# Patient Record
Sex: MALE | Race: OTHER | Employment: OTHER | URBAN - METROPOLITAN AREA
[De-identification: names, ages, dates, MRNs, and addresses within clinical notes are randomized per-mention and may not be internally consistent; named-entity substitution may affect disease eponyms.]

---

## 2019-01-04 ENCOUNTER — OFFICE VISIT (OUTPATIENT)
Dept: URGENT CARE | Facility: CLINIC | Age: 44
End: 2019-01-04
Payer: COMMERCIAL

## 2019-01-04 VITALS
SYSTOLIC BLOOD PRESSURE: 150 MMHG | RESPIRATION RATE: 18 BRPM | DIASTOLIC BLOOD PRESSURE: 89 MMHG | HEIGHT: 68 IN | OXYGEN SATURATION: 96 % | WEIGHT: 230 LBS | HEART RATE: 60 BPM | TEMPERATURE: 98 F | BODY MASS INDEX: 34.86 KG/M2

## 2019-01-04 DIAGNOSIS — T17.208A FOREIGN BODY IN PHARYNX, INITIAL ENCOUNTER: Primary | ICD-10-CM

## 2019-01-04 PROCEDURE — 99213 OFFICE O/P EST LOW 20 MIN: CPT | Performed by: EMERGENCY MEDICINE

## 2019-01-04 RX ORDER — DOXYCYCLINE 100 MG/1
100 TABLET ORAL 2 TIMES DAILY
Qty: 20 TABLET | Refills: 0 | Status: SHIPPED | OUTPATIENT
Start: 2019-01-04 | End: 2019-01-14

## 2019-01-04 RX ORDER — PREDNISONE 10 MG/1
TABLET ORAL
Qty: 27 TABLET | Refills: 0 | Status: SHIPPED | OUTPATIENT
Start: 2019-01-04

## 2019-01-04 NOTE — PATIENT INSTRUCTIONS
Foreign Body Ingestion   WHAT YOU NEED TO KNOW:   What is foreign body ingestion? A foreign body is an object you swallowed  Examples include dental work and button batteries  A foreign body can cause problems as it moves through your digestive system  What are the signs and symptoms of foreign body ingestion? You may not have any symptoms, or you may have any of the following:  · Drooling or vomiting     · Bloody vomit or rectal bleeding     · Chest pain, abdominal pain, or a feeling that something is stuck  How is foreign body ingestion diagnosed? Your healthcare provider will examine your throat, chest, and abdomen  Tell him what type of object you swallowed and when you swallowed it  He may use any of the following to find the object:  · A barium swallow or other x-rays  may be used to check your neck, chest, and abdomen  You will drink thick liquid called barium while healthcare providers take x-rays  Barium helps your esophagus and stomach show up on x-rays  · A metal detector  may be used to look for coins or other metal objects in your body  · A CT  may be used to check for objects in your esophagus or stomach  You may be given contrast liquid to help your stomach show up better  Tell the healthcare provider if you have ever had an allergic reaction to contrast liquid  · Endoscopy  may be used to see the inside of your digestive system  A scope is a long, bendable tube with a light on the end of it  A camera attached to the scope will take pictures  How is foreign body ingestion treated? Your healthcare provider may want to observe you for 24 hours or longer  Most objects pass through the digestive system and come out in a bowel movement  Objects that are small or smooth will often pass without a problem  Search for the object every time you have a bowel movement  What can I do to prevent another foreign body ingestion? · Cut your food into small pieces    Chew your food well before you swallow  · Make sure dentures or other dental fixtures are secure  You may need to go to the dentist to have dental work repaired  When should I seek immediate care? · You have a fever  · You have severe abdominal pain, nausea, or vomiting  · Your vomit or saliva is bloody  · Your bowel movements are black or bloody  When should I contact my healthcare provider? · You do not find the object in your bowel movement within 2 or 3 days  · You do not want to eat because of abdominal pain or vomiting  · You are drooling or hoarse  · You have questions or concerns about your condition or care  CARE AGREEMENT:   You have the right to help plan your care  Learn about your health condition and how it may be treated  Discuss treatment options with your caregivers to decide what care you want to receive  You always have the right to refuse treatment  The above information is an  only  It is not intended as medical advice for individual conditions or treatments  Talk to your doctor, nurse or pharmacist before following any medical regimen to see if it is safe and effective for you  © 2017 [employer] Information is for End User's use only and may not be sold, redistributed or otherwise used for commercial purposes  All illustrations and images included in CareNotes® are the copyrighted property of [employer]  or [employer]

## 2019-01-04 NOTE — PROGRESS NOTES
[facility]        NAME: Lucita Mckeon is a 37 y o  male  : 1975    MRN: [id number]  DATE: 2019  TIME: 3:59 PM    Assessment and Plan   Foreign body in pharynx, initial encounter [[id number]]  1  Foreign body in pharynx, initial encounter  Ambulatory Referral to Otolaryngology    doxycycline (ADOXA) 100 MG tablet    predniSONE 10 mg tablet    CANCELED: Ambulatory Referral to Otolaryngology     I explained to patient that the foreign body is likely in his right vallecula, and that I do not have any way to visualize it or remove it here  He claims that he called here 1st and was told that we could take care of his problem here and so he is upset now that he wasted his time and co-pay  Patient Instructions     Patient Instructions   Foreign Body Ingestion   WHAT YOU NEED TO KNOW:   What is foreign body ingestion? A foreign body is an object you swallowed  Examples include dental work and button batteries  A foreign body can cause problems as it moves through your digestive system  What are the signs and symptoms of foreign body ingestion? You may not have any symptoms, or you may have any of the following:  · Drooling or vomiting     · Bloody vomit or rectal bleeding     · Chest pain, abdominal pain, or a feeling that something is stuck  How is foreign body ingestion diagnosed? Your healthcare provider will examine your throat, chest, and abdomen  Tell him what type of object you swallowed and when you swallowed it  He may use any of the following to find the object:  · A barium swallow or other x-rays  may be used to check your neck, chest, and abdomen  You will drink thick liquid called barium while healthcare providers take x-rays  Barium helps your esophagus and stomach show up on x-rays  · A metal detector  may be used to look for coins or other metal objects in your body  · A CT  may be used to check for objects in your esophagus or stomach   You may be given contrast liquid to help your stomach show up better  Tell the healthcare provider if you have ever had an allergic reaction to contrast liquid  · Endoscopy  may be used to see the inside of your digestive system  A scope is a long, bendable tube with a light on the end of it  A camera attached to the scope will take pictures  How is foreign body ingestion treated? Your healthcare provider may want to observe you for 24 hours or longer  Most objects pass through the digestive system and come out in a bowel movement  Objects that are small or smooth will often pass without a problem  Search for the object every time you have a bowel movement  What can I do to prevent another foreign body ingestion? · Cut your food into small pieces  Chew your food well before you swallow  · Make sure dentures or other dental fixtures are secure  You may need to go to the dentist to have dental work repaired  When should I seek immediate care? · You have a fever  · You have severe abdominal pain, nausea, or vomiting  · Your vomit or saliva is bloody  · Your bowel movements are black or bloody  When should I contact my healthcare provider? · You do not find the object in your bowel movement within 2 or 3 days  · You do not want to eat because of abdominal pain or vomiting  · You are drooling or hoarse  · You have questions or concerns about your condition or care  CARE AGREEMENT:   You have the right to help plan your care  Learn about your health condition and how it may be treated  Discuss treatment options with your caregivers to decide what care you want to receive  You always have the right to refuse treatment  The above information is an  only  It is not intended as medical advice for individual conditions or treatments  Talk to your doctor, nurse or pharmacist before following any medical regimen to see if it is safe and effective for you    © 2017 [facility] Information is for End User's use only and may not be sold, redistributed or otherwise used for commercial purposes  All illustrations and images included in CareNotes® are the copyrighted property of [employer]  or [employer]  Follow up with PCP in 3-5 days  Proceed to  ER if symptoms worsen  Chief Complaint     Chief Complaint   Patient presents with    popcorn shell caught in throat     stated his right side of throat became swollen 3 days ago- was tired and feverish  Ate popcorn and got a piece of shell caught in swollen area in right side of throat         History of Present Illness        Patient complains of a foreign body sensation in his throat for the past 3 days, onset while eating popcorn  He claims it feels like  A piece of popcorn is stuck deep in his throat  He denies cough or shortness of breath  He also admits to some swelling of his right face for several days and this preceded the foreign body episode  Review of Systems   Review of Systems   Constitutional: Negative for chills and fever  HENT: Negative for congestion, ear discharge, ear pain, hearing loss, sinus pressure, sore throat and trouble swallowing  Respiratory: Negative for cough, choking, chest tightness, shortness of breath and wheezing  Gastrointestinal: Negative for diarrhea and vomiting  Musculoskeletal: Negative for neck stiffness  Skin: Negative for pallor  Neurological: Negative for headaches           Current Medications       Current Outpatient Prescriptions:     doxycycline (ADOXA) 100 MG tablet, Take 1 tablet (100 mg total) by mouth 2 (two) times a day for 10 days May substitute hyclate, Disp: 20 tablet, Rfl: 0    predniSONE 10 mg tablet, Take once daily all days pills on this schedule 6- 6- 5- 4- 3- 2- 1, Disp: 27 tablet, Rfl: 0    Current Allergies     Allergies as of 01/04/2019    (No Known Allergies)            The following portions of the patient's history were reviewed and updated as appropriate: allergies, current medications, past family history, past medical history, past social history, past surgical history and problem list      Past Medical History:   Diagnosis Date    Known health problems: none        Past Surgical History:   Procedure Laterality Date    NO PAST SURGERIES         Family History   Problem Relation Age of Onset    Adopted: Yes    No Known Problems Mother          Medications have been verified  Objective   /89   Pulse 60   Temp 98 °F (36 7 °C) (Tympanic)   Resp 18   Ht 5' 8" (1 727 m)   Wt 104 kg (230 lb)   SpO2 96%   BMI 34 97 kg/m²        Physical Exam     Physical Exam   Constitutional: He is oriented to person, place, and time  He appears well-developed and well-nourished  No distress  HENT:   Head: Normocephalic and atraumatic  Right Ear: Tympanic membrane and ear canal normal    Left Ear: Tympanic membrane and ear canal normal    Nose: Mucosal edema and rhinorrhea present  Mouth/Throat: Posterior oropharyngeal erythema present  Neck: Neck supple  Pulmonary/Chest: Effort normal and breath sounds normal    Musculoskeletal: Normal range of motion  Neurological: He is alert and oriented to person, place, and time  Skin: He is not diaphoretic  No pallor  Psychiatric: He has a normal mood and affect  Nursing note and vitals reviewed

## 2022-01-19 ENCOUNTER — NURSE TRIAGE (OUTPATIENT)
Dept: OTHER | Facility: OTHER | Age: 47
End: 2022-01-19

## 2022-01-19 DIAGNOSIS — Z11.59 SPECIAL SCREENING EXAMINATION FOR VIRAL DISEASE: Primary | ICD-10-CM

## 2022-01-19 PROCEDURE — U0005 INFEC AGEN DETEC AMPLI PROBE: HCPCS | Performed by: FAMILY MEDICINE

## 2022-01-19 PROCEDURE — U0003 INFECTIOUS AGENT DETECTION BY NUCLEIC ACID (DNA OR RNA); SEVERE ACUTE RESPIRATORY SYNDROME CORONAVIRUS 2 (SARS-COV-2) (CORONAVIRUS DISEASE [COVID-19]), AMPLIFIED PROBE TECHNIQUE, MAKING USE OF HIGH THROUGHPUT TECHNOLOGIES AS DESCRIBED BY CMS-2020-01-R: HCPCS | Performed by: FAMILY MEDICINE

## 2022-01-19 NOTE — TELEPHONE ENCOUNTER
1  Were you within 6 feet or less, for up to 15 minutes or more with a person that has a confirmed COVID-19 test? Unknown   2  What was the date of your exposure? unknown  3  Are you experiencing any symptoms attributed to the virus?  (Assess for SOB, cough, fever, difficulty breathing) chills, cough, eyes hurt, fatigue   4  HIGH RISK: Do you have any history heart or lung conditions, weakened immune system, diabetes, Asthma, CHF, HIV, COPD, Chemo, renal failure, sickle cell, etc? no  5  PREGNANCY: Are you pregnant or did you recently give birth? no  6   VACCINE: "Have you gotten the COVID-19 vaccine?" If Yes ask: "Which one, how many shots, when did you get it?" yes, moderna 2 shots Equal and normal pulses (carotid, femoral, dorsalis pedis)

## 2022-01-20 ENCOUNTER — TELEPHONE (OUTPATIENT)
Dept: OTHER | Facility: OTHER | Age: 47
End: 2022-01-20

## 2022-01-20 NOTE — TELEPHONE ENCOUNTER
Your test for the novel coronavirus, also known as COVID-19, was positive  The sample showed that the virus was present  Positive COVID-19 test results are reportable to the [place] Department of Health  You may receive a call from trained public health staff to conduct an interview  It is important to answer their call  They will ask you to verify who you are  During the call they will ask you about what symptoms you have, what you did before you got sick, and who you were close to while sick  The health department does this to make sure everyone stays healthy and to reduce the spread of the virus  If you would like to verify if the caller does in fact work in contact tracing, call the [address] [place] [address] at [employer] ([phone])  For additional information, please visit the [facility] [employer] website: [email or web address]     If you have any additional questions, we can schedule a virtual visit for you with a provider or call the [facility] [phone], option 7, for care advice    For additional information, please visit the Coronavirus FAQ on the [facility] home page ([email or web address])